# Patient Record
Sex: MALE | Race: ASIAN | NOT HISPANIC OR LATINO
[De-identification: names, ages, dates, MRNs, and addresses within clinical notes are randomized per-mention and may not be internally consistent; named-entity substitution may affect disease eponyms.]

---

## 2019-07-19 ENCOUNTER — HOSPITAL (OUTPATIENT)
Dept: OTHER | Age: 24
End: 2019-07-19
Attending: FAMILY MEDICINE

## 2019-07-19 LAB
ANALYZER ANC (IANC): ABNORMAL
ANION GAP SERPL CALC-SCNC: 17 MMOL/L (ref 10–20)
BASO+EOS+MONOS # BLD: 0.4 K/MCL (ref 0.1–1.1)
BASO+EOS+MONOS NFR BLD: 5 %
BUN SERPL-MCNC: 12 MG/DL (ref 6–20)
BUN/CREAT SERPL: 13 (ref 7–25)
CHLORIDE SERPL-SCNC: 102 MMOL/L (ref 98–107)
CO2 SERPL-SCNC: 23 MMOL/L (ref 21–32)
CREAT SERPL-MCNC: 0.9 MG/DL (ref 0.67–1.17)
DIFFERENTIAL METHOD BLD: ABNORMAL
ERYTHROCYTE [DISTWIDTH] IN BLOOD: 13.4 % (ref 11–15)
GLUCOSE SERPL-MCNC: 135 MG/DL (ref 65–99)
HCT VFR BLD CALC: 46.4 % (ref 39–51)
HGB BLD-MCNC: 15.9 G/DL (ref 13–17)
INFLUENZA A VIRUS (XFLUA): NOT DETECTED
INFLUENZA B VIRUS (XFLUB): NORMAL
INFLUENZA B VIRUS (XFLUB): NOT DETECTED
LYMPHOCYTES # BLD: 0.7 K/MCL (ref 1–4.8)
LYMPHOCYTES NFR BLD: 9 %
MCH RBC QN AUTO: 31.8 PG (ref 26–34)
MCHC RBC AUTO-ENTMCNC: 34.3 G/DL (ref 32–36.5)
MCV RBC AUTO: 92.8 FL (ref 78–100)
NEUTROPHILS # BLD: 7.1 K/MCL (ref 1.8–7.7)
NEUTROPHILS NFR BLD: 86 %
NEUTS SEG NFR BLD: ABNORMAL %
NRBC (NRBCRE): ABNORMAL
PLATELET # BLD: 200 K/MCL (ref 140–450)
POTASSIUM SERPL-SCNC: 3.8 MMOL/L (ref 3.4–5.1)
RBC # BLD: 5 MIL/MCL (ref 4.5–5.9)
SODIUM SERPL-SCNC: 138 MMOL/L (ref 135–145)
SPECIMEN SOURCE (FLUSC): NORMAL
WBC # BLD: 8.2 K/MCL (ref 4.2–11)

## 2019-07-20 ENCOUNTER — APPOINTMENT (OUTPATIENT)
Dept: CT IMAGING | Facility: HOSPITAL | Age: 24
End: 2019-07-20
Attending: EMERGENCY MEDICINE
Payer: COMMERCIAL

## 2019-07-20 ENCOUNTER — HOSPITAL ENCOUNTER (EMERGENCY)
Facility: HOSPITAL | Age: 24
Discharge: HOME | End: 2019-07-20
Attending: EMERGENCY MEDICINE
Payer: COMMERCIAL

## 2019-07-20 VITALS
HEIGHT: 67 IN | HEART RATE: 75 BPM | TEMPERATURE: 98 F | RESPIRATION RATE: 17 BRPM | BODY MASS INDEX: 27 KG/M2 | SYSTOLIC BLOOD PRESSURE: 140 MMHG | DIASTOLIC BLOOD PRESSURE: 69 MMHG | OXYGEN SATURATION: 95 %

## 2019-07-20 DIAGNOSIS — J06.9 UPPER RESPIRATORY TRACT INFECTION, UNSPECIFIED TYPE: Primary | ICD-10-CM

## 2019-07-20 DIAGNOSIS — A09 TRAVELER'S DIARRHEA: ICD-10-CM

## 2019-07-20 DIAGNOSIS — I88.0 MESENTERIC ADENITIS: ICD-10-CM

## 2019-07-20 LAB
ALBUMIN SERPL-MCNC: 3.7 G/DL (ref 3.4–5)
ALBUMIN/GLOB SERPL: 0.9 {RATIO} (ref 1–2)
ALP LIVER SERPL-CCNC: 82 U/L (ref 45–117)
ALT SERPL-CCNC: 86 U/L (ref 16–61)
ANION GAP SERPL CALC-SCNC: 7 MMOL/L (ref 0–18)
AST SERPL-CCNC: 43 U/L (ref 15–37)
BASOPHILS # BLD AUTO: 0.06 X10(3) UL (ref 0–0.2)
BASOPHILS NFR BLD AUTO: 0.7 %
BILIRUB SERPL-MCNC: 0.6 MG/DL (ref 0.1–2)
BILIRUB UR QL STRIP.AUTO: NEGATIVE
BUN BLD-MCNC: 11 MG/DL (ref 7–18)
BUN/CREAT SERPL: 10.3 (ref 10–20)
CALCIUM BLD-MCNC: 8.9 MG/DL (ref 8.5–10.1)
CHLORIDE SERPL-SCNC: 104 MMOL/L (ref 98–112)
CLARITY UR REFRACT.AUTO: CLEAR
CO2 SERPL-SCNC: 25 MMOL/L (ref 21–32)
COLOR UR AUTO: YELLOW
CREAT BLD-MCNC: 1.07 MG/DL (ref 0.7–1.3)
DEPRECATED RDW RBC AUTO: 43 FL (ref 35.1–46.3)
EOSINOPHIL # BLD AUTO: 0.03 X10(3) UL (ref 0–0.7)
EOSINOPHIL NFR BLD AUTO: 0.3 %
ERYTHROCYTE [DISTWIDTH] IN BLOOD BY AUTOMATED COUNT: 12.4 % (ref 11–15)
GLOBULIN PLAS-MCNC: 3.9 G/DL (ref 2.8–4.4)
GLUCOSE BLD-MCNC: 128 MG/DL (ref 70–99)
GLUCOSE UR STRIP.AUTO-MCNC: NEGATIVE MG/DL
HCT VFR BLD AUTO: 47.2 % (ref 39–53)
HGB BLD-MCNC: 16.2 G/DL (ref 13–17.5)
IMM GRANULOCYTES # BLD AUTO: 0.06 X10(3) UL (ref 0–1)
IMM GRANULOCYTES NFR BLD: 0.7 %
KETONES UR STRIP.AUTO-MCNC: NEGATIVE MG/DL
LEUKOCYTE ESTERASE UR QL STRIP.AUTO: NEGATIVE
LIPASE SERPL-CCNC: 51 U/L (ref 73–393)
LYMPHOCYTES # BLD AUTO: 0.92 X10(3) UL (ref 1–4)
LYMPHOCYTES NFR BLD AUTO: 10.4 %
M PROTEIN MFR SERPL ELPH: 7.6 G/DL (ref 6.4–8.2)
MCH RBC QN AUTO: 31.7 PG (ref 26–34)
MCHC RBC AUTO-ENTMCNC: 34.3 G/DL (ref 31–37)
MCV RBC AUTO: 92.4 FL (ref 80–100)
MONOCYTES # BLD AUTO: 0.76 X10(3) UL (ref 0.1–1)
MONOCYTES NFR BLD AUTO: 8.6 %
NEUTROPHILS # BLD AUTO: 7.03 X10 (3) UL (ref 1.5–7.7)
NEUTROPHILS # BLD AUTO: 7.03 X10(3) UL (ref 1.5–7.7)
NEUTROPHILS NFR BLD AUTO: 79.3 %
NITRITE UR QL STRIP.AUTO: NEGATIVE
OSMOLALITY SERPL CALC.SUM OF ELEC: 283 MOSM/KG (ref 275–295)
PH UR STRIP.AUTO: 6 [PH] (ref 4.5–8)
PLATELET # BLD AUTO: 200 10(3)UL (ref 150–450)
POTASSIUM SERPL-SCNC: 3.3 MMOL/L (ref 3.5–5.1)
PROT UR STRIP.AUTO-MCNC: NEGATIVE MG/DL
RBC # BLD AUTO: 5.11 X10(6)UL (ref 4.3–5.7)
RBC UR QL AUTO: NEGATIVE
SODIUM SERPL-SCNC: 136 MMOL/L (ref 136–145)
SP GR UR STRIP.AUTO: 1.03 (ref 1–1.03)
UROBILINOGEN UR STRIP.AUTO-MCNC: <2 MG/DL
WBC # BLD AUTO: 8.9 X10(3) UL (ref 4–11)

## 2019-07-20 PROCEDURE — 96361 HYDRATE IV INFUSION ADD-ON: CPT

## 2019-07-20 PROCEDURE — 81003 URINALYSIS AUTO W/O SCOPE: CPT | Performed by: EMERGENCY MEDICINE

## 2019-07-20 PROCEDURE — 96374 THER/PROPH/DIAG INJ IV PUSH: CPT

## 2019-07-20 PROCEDURE — 99284 EMERGENCY DEPT VISIT MOD MDM: CPT

## 2019-07-20 PROCEDURE — 74177 CT ABD & PELVIS W/CONTRAST: CPT | Performed by: EMERGENCY MEDICINE

## 2019-07-20 PROCEDURE — 83690 ASSAY OF LIPASE: CPT | Performed by: EMERGENCY MEDICINE

## 2019-07-20 PROCEDURE — 80053 COMPREHEN METABOLIC PANEL: CPT | Performed by: EMERGENCY MEDICINE

## 2019-07-20 PROCEDURE — 85025 COMPLETE CBC W/AUTO DIFF WBC: CPT | Performed by: EMERGENCY MEDICINE

## 2019-07-20 RX ORDER — KETOROLAC TROMETHAMINE 30 MG/ML
30 INJECTION, SOLUTION INTRAMUSCULAR; INTRAVENOUS ONCE
Status: COMPLETED | OUTPATIENT
Start: 2019-07-20 | End: 2019-07-20

## 2019-07-20 RX ORDER — CIPROFLOXACIN 500 MG/1
500 TABLET, FILM COATED ORAL 2 TIMES DAILY
Qty: 14 TABLET | Refills: 0 | Status: SHIPPED | OUTPATIENT
Start: 2019-07-20 | End: 2019-07-27

## 2019-07-20 RX ORDER — DICYCLOMINE HCL 20 MG
20 TABLET ORAL 4 TIMES DAILY PRN
Qty: 30 TABLET | Refills: 0 | Status: SHIPPED | OUTPATIENT
Start: 2019-07-20 | End: 2019-08-19

## 2019-07-21 NOTE — ED PROVIDER NOTES
Patient Seen in: [facility] Emergency Department    History   Patient presents with:  Fever (infectious)    Stated Complaint: fever, cough, headache, diarrhea abdomen pain    HPI  Patient is 28-year-old male who states that past 4 to 5 days he has h Resp 07/20/19 1840 16   Temp 07/20/19 1842 97.9 °F (36.6 °C)   Temp src 07/20/19 1842 Temporal   SpO2 07/20/19 1840 97 %   O2 Device 07/20/19 1840 None (Room air)       Current:/69   Pulse 75   Temp 97.9 °F (36.6 °C) (Temporal)   Resp 17   Ht 170. 2 Final result                 Please view results for these tests on the individual orders. URINALYSIS, ROUTINE   RAINBOW DRAW BLUE   RAINBOW DRAW LAVENDER   RAINBOW DRAW LIGHT GREEN   RAINBOW DRAW GOLD          CT abdomen pelvis1.  Imaging findi

## 2019-09-03 ENCOUNTER — HOSPITAL (OUTPATIENT)
Dept: OTHER | Age: 24
End: 2019-09-03
Attending: EMERGENCY MEDICINE

## 2019-12-07 ENCOUNTER — HOSPITAL (OUTPATIENT)
Dept: OTHER | Age: 24
End: 2019-12-07
Attending: EMERGENCY MEDICINE

## 2019-12-08 ENCOUNTER — APPOINTMENT (OUTPATIENT)
Dept: ULTRASOUND IMAGING | Facility: HOSPITAL | Age: 24
End: 2019-12-08
Attending: EMERGENCY MEDICINE
Payer: COMMERCIAL

## 2019-12-08 ENCOUNTER — HOSPITAL ENCOUNTER (EMERGENCY)
Facility: HOSPITAL | Age: 24
Discharge: HOME | End: 2019-12-08
Attending: EMERGENCY MEDICINE
Payer: COMMERCIAL

## 2019-12-08 VITALS
TEMPERATURE: 97 F | HEART RATE: 66 BPM | OXYGEN SATURATION: 97 % | WEIGHT: 185 LBS | DIASTOLIC BLOOD PRESSURE: 67 MMHG | SYSTOLIC BLOOD PRESSURE: 135 MMHG | RESPIRATION RATE: 18 BRPM | BODY MASS INDEX: 29 KG/M2

## 2019-12-08 DIAGNOSIS — R19.7 DIARRHEA, UNSPECIFIED TYPE: Primary | ICD-10-CM

## 2019-12-08 DIAGNOSIS — L50.9 URTICARIA: ICD-10-CM

## 2019-12-08 DIAGNOSIS — R79.89 ELEVATED LFTS: ICD-10-CM

## 2019-12-08 PROCEDURE — 96361 HYDRATE IV INFUSION ADD-ON: CPT

## 2019-12-08 PROCEDURE — 96375 TX/PRO/DX INJ NEW DRUG ADDON: CPT

## 2019-12-08 PROCEDURE — 99284 EMERGENCY DEPT VISIT MOD MDM: CPT

## 2019-12-08 PROCEDURE — 96374 THER/PROPH/DIAG INJ IV PUSH: CPT

## 2019-12-08 PROCEDURE — 85025 COMPLETE CBC W/AUTO DIFF WBC: CPT | Performed by: EMERGENCY MEDICINE

## 2019-12-08 PROCEDURE — 80053 COMPREHEN METABOLIC PANEL: CPT | Performed by: EMERGENCY MEDICINE

## 2019-12-08 PROCEDURE — 83690 ASSAY OF LIPASE: CPT | Performed by: EMERGENCY MEDICINE

## 2019-12-08 PROCEDURE — 76700 US EXAM ABDOM COMPLETE: CPT | Performed by: EMERGENCY MEDICINE

## 2019-12-08 RX ORDER — METHYLPREDNISOLONE SODIUM SUCCINATE 125 MG/2ML
125 INJECTION, POWDER, LYOPHILIZED, FOR SOLUTION INTRAMUSCULAR; INTRAVENOUS ONCE
Status: COMPLETED | OUTPATIENT
Start: 2019-12-08 | End: 2019-12-08

## 2019-12-08 RX ORDER — OMEPRAZOLE 40 MG/1
40 CAPSULE, DELAYED RELEASE ORAL DAILY
Qty: 30 CAPSULE | Refills: 0 | Status: SHIPPED | OUTPATIENT
Start: 2019-12-08 | End: 2020-01-07

## 2019-12-08 RX ORDER — METOCLOPRAMIDE HYDROCHLORIDE 5 MG/ML
10 INJECTION INTRAMUSCULAR; INTRAVENOUS ONCE
Status: COMPLETED | OUTPATIENT
Start: 2019-12-08 | End: 2019-12-08

## 2019-12-08 RX ORDER — PREDNISONE 20 MG/1
60 TABLET ORAL DAILY
Qty: 12 TABLET | Refills: 0 | Status: SHIPPED | OUTPATIENT
Start: 2019-12-08 | End: 2019-12-12

## 2019-12-08 NOTE — ED INITIAL ASSESSMENT (HPI)
24YM c/c of allergic rxn Pt state that he started having R eye swelling and generalized body itching a hour prior to arrival

## 2019-12-08 NOTE — ED PROVIDER NOTES
Patient Seen in: [facility] Emergency Department      History   Patient presents with: Allergic Rxn Allergies  Cough/URI    Stated Complaint: pt states his left eye just got itchy, eye is swollen no known allergies.  pt st*    HPI    Patient is a 25 kg/m²         Physical Exam    GENERAL: No acute distress, well appearing and non-toxic, Alert and oriented X 3   HEENT: Normocephalic, atraumatic. Mild swelling to the left upper and lower eyelid. Moist mucous membranes.   Pupils equal round reactive to -----------         ------                     CBC W/ DIFFERENTIAL[[id number]]          Abnormal            Final result                 Please view results for these tests on the individual orders.           Us Abdomen Complete (cpt=7 week and some epigastric abdominal pain postprandial nature. Patient with history of elevated LFTs which are slightly more elevated than his baseline. Recommend he follow these up with his primary doctor.   He did undergo ultrasound of his abdomen showing mouth daily for 4 days. , Print, Disp-12 tablet, R-0

## 2019-12-08 NOTE — ED PROVIDER NOTES
Patient Seen in: [facility] Emergency Department      History   Patient presents with: Allergic Rxn Allergies  Cough/URI    Stated Complaint: pt states his left eye just got itchy, eye is swollen no known allergies.  pt st*    HPI    Patient is a 25 kg/m²         Physical Exam    GENERAL: No acute distress, well appearing and non-toxic, Alert and oriented X 3   HEENT: Normocephalic, atraumatic. Mild swelling to the left upper and lower eyelid. Moist mucous membranes.   Pupils equal round reactive to Please view results for these tests on the individual orders. URINALYSIS WITH CULTURE REFLEX   Glucose 115. AST 52. .        ***        MDM     ***              Disposition and Plan     Clinical Impression:  Diarrhea, unspecified type  (prima

## 2022-04-03 ENCOUNTER — HOSPITAL ENCOUNTER (EMERGENCY)
Facility: HOSPITAL | Age: 27
Discharge: HOME | End: 2022-04-03
Attending: EMERGENCY MEDICINE
Payer: COMMERCIAL

## 2022-04-03 ENCOUNTER — APPOINTMENT (OUTPATIENT)
Dept: GENERAL RADIOLOGY | Facility: HOSPITAL | Age: 27
End: 2022-04-03
Attending: EMERGENCY MEDICINE
Payer: COMMERCIAL

## 2022-04-03 VITALS
TEMPERATURE: 98 F | HEIGHT: 67 IN | BODY MASS INDEX: 25.11 KG/M2 | RESPIRATION RATE: 18 BRPM | SYSTOLIC BLOOD PRESSURE: 118 MMHG | OXYGEN SATURATION: 96 % | WEIGHT: 160 LBS | HEART RATE: 92 BPM | DIASTOLIC BLOOD PRESSURE: 67 MMHG

## 2022-04-03 DIAGNOSIS — S39.012A STRAIN OF LUMBAR REGION, INITIAL ENCOUNTER: ICD-10-CM

## 2022-04-03 DIAGNOSIS — S16.1XXA STRAIN OF NECK MUSCLE, INITIAL ENCOUNTER: ICD-10-CM

## 2022-04-03 DIAGNOSIS — Z04.1 ENCOUNTER FOR EXAMINATION FOLLOWING MOTOR VEHICLE COLLISION (MVC): Primary | ICD-10-CM

## 2022-04-03 PROCEDURE — 96372 THER/PROPH/DIAG INJ SC/IM: CPT

## 2022-04-03 PROCEDURE — 73562 X-RAY EXAM OF KNEE 3: CPT | Performed by: EMERGENCY MEDICINE

## 2022-04-03 PROCEDURE — 99284 EMERGENCY DEPT VISIT MOD MDM: CPT

## 2022-04-03 PROCEDURE — 72050 X-RAY EXAM NECK SPINE 4/5VWS: CPT | Performed by: EMERGENCY MEDICINE

## 2022-04-03 PROCEDURE — 72100 X-RAY EXAM L-S SPINE 2/3 VWS: CPT | Performed by: EMERGENCY MEDICINE

## 2022-04-03 PROCEDURE — 73030 X-RAY EXAM OF SHOULDER: CPT | Performed by: EMERGENCY MEDICINE

## 2022-04-03 RX ORDER — KETOROLAC TROMETHAMINE 30 MG/ML
60 INJECTION, SOLUTION INTRAMUSCULAR; INTRAVENOUS ONCE
Status: COMPLETED | OUTPATIENT
Start: 2022-04-03 | End: 2022-04-03
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
## (undated) NOTE — ED AVS SNAPSHOT
Nely Vicente   MRN: [id number]    Department:  [facility] Emergency Department   Date of Visit:  7/20/2019           Disclosure     Insurance plans vary and the physician(s) referred by the ER may not be covered by your plan.  Please contact your tell this physician (or your personal doctor if your instructions are to return to your personal doctor) about any new or lasting problems. The primary care or specialist physician will see patients referred from the [facility] Emergency Department.  Mervin Valdez

## (undated) NOTE — ED AVS SNAPSHOT
Cassie Arce   MRN: [id number]    Department:  [facility] Emergency Department   Date of Visit:  12/8/2019           Disclosure     Insurance plans vary and the physician(s) referred by the ER may not be covered by your plan.  Please contact tell this physician (or your personal doctor if your instructions are to return to your personal doctor) about any new or lasting problems. The primary care or specialist physician will see patients referred from the [facility] Emergency Department.  Umu Karimi